# Patient Record
Sex: MALE | Race: OTHER | HISPANIC OR LATINO
[De-identification: names, ages, dates, MRNs, and addresses within clinical notes are randomized per-mention and may not be internally consistent; named-entity substitution may affect disease eponyms.]

---

## 2024-11-21 ENCOUNTER — DASHBOARD ENCOUNTERS (OUTPATIENT)
Age: 49
End: 2024-11-21

## 2024-11-21 ENCOUNTER — OFFICE VISIT (OUTPATIENT)
Dept: URBAN - METROPOLITAN AREA CLINIC 118 | Facility: CLINIC | Age: 49
End: 2024-11-21

## 2024-11-21 ENCOUNTER — LAB OUTSIDE AN ENCOUNTER (OUTPATIENT)
Dept: URBAN - METROPOLITAN AREA CLINIC 118 | Facility: CLINIC | Age: 49
End: 2024-11-21

## 2024-11-21 VITALS
SYSTOLIC BLOOD PRESSURE: 117 MMHG | HEART RATE: 103 BPM | TEMPERATURE: 97.8 F | WEIGHT: 244.2 LBS | DIASTOLIC BLOOD PRESSURE: 76 MMHG | BODY MASS INDEX: 37.01 KG/M2 | HEIGHT: 68 IN

## 2024-11-21 PROBLEM — 197321007: Status: ACTIVE | Noted: 2024-11-21

## 2024-11-21 RX ORDER — MELOXICAM 15 MG/1
1 TABLET TABLET ORAL ONCE A DAY
Status: ACTIVE | COMMUNITY

## 2024-11-21 RX ORDER — METFORMIN HYDROCHLORIDE 850 MG/1
1 TABLET WITH A MEAL TABLET, FILM COATED ORAL ONCE A DAY
Status: ACTIVE | COMMUNITY

## 2024-11-21 RX ORDER — GABAPENTIN 600 MG/1
1 TABLET TABLET, FILM COATED ORAL ONCE A DAY
Refills: 0 | Status: ACTIVE | COMMUNITY
Start: 1900-01-01

## 2024-12-01 PROBLEM — 162049009: Status: ACTIVE | Noted: 2024-12-01

## 2024-12-01 PROBLEM — 267036007: Status: ACTIVE | Noted: 2024-12-01

## 2024-12-01 PROBLEM — 863927004: Status: ACTIVE | Noted: 2024-12-01

## 2024-12-01 PROBLEM — 15555002: Status: ACTIVE | Noted: 2024-12-01

## 2025-01-23 ENCOUNTER — OFFICE VISIT (OUTPATIENT)
Dept: URBAN - METROPOLITAN AREA CLINIC 118 | Facility: CLINIC | Age: 50
End: 2025-01-23

## 2025-05-06 ENCOUNTER — TELEPHONE ENCOUNTER (OUTPATIENT)
Dept: URBAN - METROPOLITAN AREA CLINIC 109 | Facility: CLINIC | Age: 50
End: 2025-05-06

## 2025-06-26 ENCOUNTER — OFFICE VISIT (OUTPATIENT)
Dept: URBAN - METROPOLITAN AREA CLINIC 117 | Facility: CLINIC | Age: 50
End: 2025-06-26
Payer: COMMERCIAL

## 2025-06-26 DIAGNOSIS — K74.60 ADVANCED CIRRHOSIS: ICD-10-CM

## 2025-06-26 DIAGNOSIS — K76.0 FATTY (CHANGE OF) LIVER: ICD-10-CM

## 2025-06-26 PROCEDURE — 76981 USE PARENCHYMA: CPT | Performed by: INTERNAL MEDICINE

## 2025-06-26 RX ORDER — MELOXICAM 15 MG/1
1 TABLET TABLET ORAL ONCE A DAY
Status: ACTIVE | COMMUNITY

## 2025-06-26 RX ORDER — METFORMIN HYDROCHLORIDE 850 MG/1
1 TABLET WITH A MEAL TABLET, FILM COATED ORAL ONCE A DAY
Status: ACTIVE | COMMUNITY

## 2025-06-26 RX ORDER — GABAPENTIN 600 MG/1
1 TABLET TABLET, FILM COATED ORAL ONCE A DAY
Refills: 0 | Status: ACTIVE | COMMUNITY
Start: 1900-01-01

## 2025-07-31 ENCOUNTER — OFFICE VISIT (OUTPATIENT)
Dept: URBAN - METROPOLITAN AREA CLINIC 118 | Facility: CLINIC | Age: 50
End: 2025-07-31

## 2025-07-31 RX ORDER — MELOXICAM 15 MG/1
1 TABLET TABLET ORAL ONCE A DAY
Status: ACTIVE | COMMUNITY

## 2025-07-31 RX ORDER — GABAPENTIN 600 MG/1
1 TABLET TABLET, FILM COATED ORAL ONCE A DAY
Refills: 0 | Status: ACTIVE | COMMUNITY
Start: 1900-01-01

## 2025-07-31 RX ORDER — SEMAGLUTIDE 1.34 MG/ML
INJECT 1 MG SUBCUTANEOUSLY ONCE A WEEK INJECTION, SOLUTION SUBCUTANEOUS
Qty: 3 MILLILITER | Refills: 0 | Status: ON HOLD | COMMUNITY

## 2025-07-31 RX ORDER — TRAZODONE HYDROCHLORIDE 50 MG/1
TAKE 1 TABLET BY MOUTH NIGHTLY TABLET ORAL
Qty: 90 EACH | Refills: 0 | Status: ACTIVE | COMMUNITY

## 2025-07-31 RX ORDER — METFORMIN HYDROCHLORIDE 850 MG/1
1 TABLET WITH A MEAL TABLET, FILM COATED ORAL ONCE A DAY
Status: ACTIVE | COMMUNITY

## 2025-07-31 NOTE — HPI-TODAY'S VISIT:
wife present with him has had fatty liver for years  hx of elevated liver numbers in the past  no regular alcohol use  diabetes, lupus as well

## 2025-08-01 ENCOUNTER — LAB OUTSIDE AN ENCOUNTER (OUTPATIENT)
Dept: URBAN - METROPOLITAN AREA CLINIC 118 | Facility: CLINIC | Age: 50
End: 2025-08-01